# Patient Record
Sex: FEMALE | URBAN - METROPOLITAN AREA
[De-identification: names, ages, dates, MRNs, and addresses within clinical notes are randomized per-mention and may not be internally consistent; named-entity substitution may affect disease eponyms.]

---
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
## 2024-12-13 NOTE — ED NOTES
Order for next labs on chart.   Pt started having headaches, nasal congestion & fever since yesterday. Pt has been taking tylenol for the fever. Afebrile in triage.